# Patient Record
Sex: FEMALE | Race: WHITE | HISPANIC OR LATINO
[De-identification: names, ages, dates, MRNs, and addresses within clinical notes are randomized per-mention and may not be internally consistent; named-entity substitution may affect disease eponyms.]

---

## 2017-03-04 ENCOUNTER — APPOINTMENT (OUTPATIENT)
Dept: MAMMOGRAPHY | Facility: IMAGING CENTER | Age: 72
End: 2017-03-04

## 2017-03-04 ENCOUNTER — OUTPATIENT (OUTPATIENT)
Dept: OUTPATIENT SERVICES | Facility: HOSPITAL | Age: 72
LOS: 1 days | End: 2017-03-04
Payer: COMMERCIAL

## 2017-03-04 DIAGNOSIS — Z00.8 ENCOUNTER FOR OTHER GENERAL EXAMINATION: ICD-10-CM

## 2017-03-04 PROCEDURE — 77063 BREAST TOMOSYNTHESIS BI: CPT

## 2017-03-04 PROCEDURE — 77067 SCR MAMMO BI INCL CAD: CPT

## 2018-03-15 ENCOUNTER — OUTPATIENT (OUTPATIENT)
Dept: OUTPATIENT SERVICES | Facility: HOSPITAL | Age: 73
LOS: 1 days | End: 2018-03-15

## 2018-03-15 ENCOUNTER — APPOINTMENT (OUTPATIENT)
Dept: OBGYN | Facility: HOSPITAL | Age: 73
End: 2018-03-15
Payer: MEDICARE

## 2018-03-15 ENCOUNTER — RESULT REVIEW (OUTPATIENT)
Age: 73
End: 2018-03-15

## 2018-03-15 ENCOUNTER — LABORATORY RESULT (OUTPATIENT)
Age: 73
End: 2018-03-15

## 2018-03-15 VITALS
HEIGHT: 63 IN | BODY MASS INDEX: 27.64 KG/M2 | WEIGHT: 156 LBS | DIASTOLIC BLOOD PRESSURE: 82 MMHG | SYSTOLIC BLOOD PRESSURE: 152 MMHG

## 2018-03-15 DIAGNOSIS — N95.2 POSTMENOPAUSAL ATROPHIC VAGINITIS: ICD-10-CM

## 2018-03-15 DIAGNOSIS — Z00.00 ENCOUNTER FOR GENERAL ADULT MEDICAL EXAMINATION W/OUT ABNORMAL FINDINGS: ICD-10-CM

## 2018-03-15 DIAGNOSIS — Z87.19 PERSONAL HISTORY OF OTHER DISEASES OF THE DIGESTIVE SYSTEM: ICD-10-CM

## 2018-03-15 DIAGNOSIS — M85.80 OTHER SPECIFIED DISORDERS OF BONE DENSITY AND STRUCTURE, UNSPECIFIED SITE: ICD-10-CM

## 2018-03-15 PROCEDURE — 99213 OFFICE O/P EST LOW 20 MIN: CPT | Mod: GE

## 2018-03-16 DIAGNOSIS — Z00.00 ENCOUNTER FOR GENERAL ADULT MEDICAL EXAMINATION WITHOUT ABNORMAL FINDINGS: ICD-10-CM

## 2018-03-16 DIAGNOSIS — M85.80 OTHER SPECIFIED DISORDERS OF BONE DENSITY AND STRUCTURE, UNSPECIFIED SITE: ICD-10-CM

## 2018-03-16 DIAGNOSIS — N95.2 POSTMENOPAUSAL ATROPHIC VAGINITIS: ICD-10-CM

## 2018-03-16 LAB
C TRACH RRNA SPEC QL NAA+PROBE: SIGNIFICANT CHANGE UP
HPV HIGH+LOW RISK DNA PNL CVX: SIGNIFICANT CHANGE UP
N GONORRHOEA RRNA SPEC QL NAA+PROBE: SIGNIFICANT CHANGE UP
SPECIMEN SOURCE: SIGNIFICANT CHANGE UP

## 2018-03-19 LAB — CYTOLOGY SPEC DOC CYTO: SIGNIFICANT CHANGE UP

## 2018-04-21 ENCOUNTER — EMERGENCY (EMERGENCY)
Facility: HOSPITAL | Age: 73
LOS: 1 days | Discharge: HOME | End: 2018-04-21
Attending: EMERGENCY MEDICINE | Admitting: EMERGENCY MEDICINE
Payer: COMMERCIAL

## 2018-04-21 VITALS
HEART RATE: 86 BPM | RESPIRATION RATE: 16 BRPM | SYSTOLIC BLOOD PRESSURE: 155 MMHG | OXYGEN SATURATION: 100 % | TEMPERATURE: 98 F | DIASTOLIC BLOOD PRESSURE: 87 MMHG

## 2018-04-21 LAB
APPEARANCE UR: CLEAR — SIGNIFICANT CHANGE UP
BILIRUB UR-MCNC: NEGATIVE — SIGNIFICANT CHANGE UP
BLOOD UR QL VISUAL: NEGATIVE — SIGNIFICANT CHANGE UP
COLOR SPEC: YELLOW — SIGNIFICANT CHANGE UP
GLUCOSE UR-MCNC: NEGATIVE — SIGNIFICANT CHANGE UP
KETONES UR-MCNC: NEGATIVE — SIGNIFICANT CHANGE UP
LEUKOCYTE ESTERASE UR-ACNC: HIGH
MUCOUS THREADS # UR AUTO: SIGNIFICANT CHANGE UP
NITRITE UR-MCNC: NEGATIVE — SIGNIFICANT CHANGE UP
PH UR: 7 — SIGNIFICANT CHANGE UP (ref 4.6–8)
PROT UR-MCNC: NEGATIVE MG/DL — SIGNIFICANT CHANGE UP
RBC CASTS # UR COMP ASSIST: HIGH (ref 0–?)
SP GR SPEC: 1.02 — SIGNIFICANT CHANGE UP (ref 1–1.04)
SQUAMOUS # UR AUTO: SIGNIFICANT CHANGE UP
UROBILINOGEN FLD QL: 1 MG/DL — SIGNIFICANT CHANGE UP
WBC UR QL: SIGNIFICANT CHANGE UP (ref 0–?)

## 2018-04-21 PROCEDURE — 72131 CT LUMBAR SPINE W/O DYE: CPT | Mod: 26

## 2018-04-21 PROCEDURE — 99284 EMERGENCY DEPT VISIT MOD MDM: CPT

## 2018-04-21 RX ORDER — ACETAMINOPHEN 500 MG
650 TABLET ORAL ONCE
Qty: 0 | Refills: 0 | Status: COMPLETED | OUTPATIENT
Start: 2018-04-21 | End: 2018-04-21

## 2018-04-21 RX ADMIN — Medication 650 MILLIGRAM(S): at 20:01
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
## 2018-04-22 VITALS
SYSTOLIC BLOOD PRESSURE: 122 MMHG | TEMPERATURE: 99 F | HEART RATE: 76 BPM | RESPIRATION RATE: 17 BRPM | DIASTOLIC BLOOD PRESSURE: 74 MMHG | OXYGEN SATURATION: 99 %

## 2018-04-22 LAB
ALBUMIN SERPL ELPH-MCNC: 3.8 G/DL — SIGNIFICANT CHANGE UP (ref 3.3–5)
ALP SERPL-CCNC: 70 U/L — SIGNIFICANT CHANGE UP (ref 40–120)
ALT FLD-CCNC: 16 U/L — SIGNIFICANT CHANGE UP (ref 4–33)
AST SERPL-CCNC: 18 U/L — SIGNIFICANT CHANGE UP (ref 4–32)
BILIRUB SERPL-MCNC: 1.2 MG/DL — SIGNIFICANT CHANGE UP (ref 0.2–1.2)
BUN SERPL-MCNC: 12 MG/DL — SIGNIFICANT CHANGE UP (ref 7–23)
CALCIUM SERPL-MCNC: 9.2 MG/DL — SIGNIFICANT CHANGE UP (ref 8.4–10.5)
CHLORIDE SERPL-SCNC: 103 MMOL/L — SIGNIFICANT CHANGE UP (ref 98–107)
CO2 SERPL-SCNC: 27 MMOL/L — SIGNIFICANT CHANGE UP (ref 22–31)
CREAT SERPL-MCNC: 0.7 MG/DL — SIGNIFICANT CHANGE UP (ref 0.5–1.3)
GLUCOSE SERPL-MCNC: 108 MG/DL — HIGH (ref 70–99)
HCT VFR BLD CALC: 37 % — SIGNIFICANT CHANGE UP (ref 34.5–45)
HGB BLD-MCNC: 12.2 G/DL — SIGNIFICANT CHANGE UP (ref 11.5–15.5)
MCHC RBC-ENTMCNC: 28.5 PG — SIGNIFICANT CHANGE UP (ref 27–34)
MCHC RBC-ENTMCNC: 33 % — SIGNIFICANT CHANGE UP (ref 32–36)
MCV RBC AUTO: 86.4 FL — SIGNIFICANT CHANGE UP (ref 80–100)
NRBC # FLD: 0 — SIGNIFICANT CHANGE UP
PLATELET # BLD AUTO: 143 K/UL — LOW (ref 150–400)
PMV BLD: 11.8 FL — SIGNIFICANT CHANGE UP (ref 7–13)
POTASSIUM SERPL-MCNC: 3.8 MMOL/L — SIGNIFICANT CHANGE UP (ref 3.5–5.3)
POTASSIUM SERPL-SCNC: 3.8 MMOL/L — SIGNIFICANT CHANGE UP (ref 3.5–5.3)
PROT SERPL-MCNC: 6.5 G/DL — SIGNIFICANT CHANGE UP (ref 6–8.3)
RBC # BLD: 4.28 M/UL — SIGNIFICANT CHANGE UP (ref 3.8–5.2)
RBC # FLD: 13.2 % — SIGNIFICANT CHANGE UP (ref 10.3–14.5)
SODIUM SERPL-SCNC: 140 MMOL/L — SIGNIFICANT CHANGE UP (ref 135–145)
WBC # BLD: 6.48 K/UL — SIGNIFICANT CHANGE UP (ref 3.8–10.5)
WBC # FLD AUTO: 6.48 K/UL — SIGNIFICANT CHANGE UP (ref 3.8–10.5)

## 2018-04-22 PROCEDURE — 74177 CT ABD & PELVIS W/CONTRAST: CPT | Mod: 26

## 2018-04-22 RX ORDER — OXYCODONE HYDROCHLORIDE 5 MG/1
1 TABLET ORAL
Qty: 8 | Refills: 0 | OUTPATIENT
Start: 2018-04-22

## 2018-04-22 RX ORDER — IBUPROFEN 200 MG
1 TABLET ORAL
Qty: 30 | Refills: 0 | OUTPATIENT
Start: 2018-04-22

## 2018-04-22 NOTE — ED PROVIDER NOTE - CHPI ED SYMPTOMS NEG
no loss of consciousness/no head trauma, no light headedness, no nausea, no vomiting, no fever, no chills, no CP, no SOB
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
## 2018-04-22 NOTE — ED ADULT NURSE REASSESSMENT NOTE - NS ED NURSE REASSESS COMMENT FT1
No acute distress at present. Respirations are even and unlabored on room air. Pt. is discharged. Discharge teaching done by MD Lay. IV discontinued, cath intact. Pt. is awaiting pickup from family at present.
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
## 2018-04-22 NOTE — ED PROVIDER NOTE - ABDOMINAL EXAM
mild suprapubic TTP, soft, nondistended, no rebound or guarding./tender... soft/nontender.../nondistended
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
## 2018-04-22 NOTE — ED ADULT NURSE REASSESSMENT NOTE - NS ED NURSE REASSESS COMMENT FT1
Pt. received from intake. Report received from AL[facility] Lloyd. Pt. received with 20 gauge IV in left ac. No acute distress at present. Respirations are even and unlabored on room air. Will continue to monitor.

## 2018-04-22 NOTE — ED ADULT NURSE NOTE - ED STAT RN HANDOFF DETAILS
Rpt to [facility] ED 18a, awaiting CT abd/pelvis, pt aaox3, ambulatory w/o asst, slowly, appearing in no distress, VSS.

## 2018-05-25 ENCOUNTER — APPOINTMENT (OUTPATIENT)
Dept: ORTHOPEDIC SURGERY | Facility: CLINIC | Age: 73
End: 2018-05-25
Payer: COMMERCIAL

## 2018-05-25 VITALS — WEIGHT: 159 LBS | HEIGHT: 63 IN | BODY MASS INDEX: 28.17 KG/M2

## 2018-05-25 DIAGNOSIS — M43.16 SPONDYLOLISTHESIS, LUMBAR REGION: ICD-10-CM

## 2018-05-25 DIAGNOSIS — S32.009A UNSPECIFIED FRACTURE OF UNSPECIFIED LUMBAR VERTEBRA, INITIAL ENCOUNTER FOR CLOSED FRACTURE: ICD-10-CM

## 2018-05-25 PROCEDURE — 72100 X-RAY EXAM L-S SPINE 2/3 VWS: CPT

## 2018-05-25 PROCEDURE — 99203 OFFICE O/P NEW LOW 30 MIN: CPT

## 2018-07-30 PROBLEM — N95.2 POSTMENOPAUSAL ATROPHIC VAGINITIS: Status: ACTIVE | Noted: 2018-03-15

## 2018-08-28 ENCOUNTER — APPOINTMENT (OUTPATIENT)
Dept: MAMMOGRAPHY | Facility: IMAGING CENTER | Age: 73
End: 2018-08-28
Payer: MEDICARE

## 2018-08-28 ENCOUNTER — OUTPATIENT (OUTPATIENT)
Dept: OUTPATIENT SERVICES | Facility: HOSPITAL | Age: 73
LOS: 1 days | End: 2018-08-28
Payer: COMMERCIAL

## 2018-08-28 DIAGNOSIS — Z00.8 ENCOUNTER FOR OTHER GENERAL EXAMINATION: ICD-10-CM

## 2018-08-28 PROCEDURE — 77067 SCR MAMMO BI INCL CAD: CPT

## 2018-08-28 PROCEDURE — 77067 SCR MAMMO BI INCL CAD: CPT | Mod: 26

## 2018-08-28 PROCEDURE — 77063 BREAST TOMOSYNTHESIS BI: CPT

## 2018-08-28 PROCEDURE — 77063 BREAST TOMOSYNTHESIS BI: CPT | Mod: 26

## 2019-10-28 ENCOUNTER — OUTPATIENT (OUTPATIENT)
Dept: OUTPATIENT SERVICES | Facility: HOSPITAL | Age: 74
LOS: 1 days | End: 2019-10-28
Payer: COMMERCIAL

## 2019-10-28 ENCOUNTER — APPOINTMENT (OUTPATIENT)
Dept: MAMMOGRAPHY | Facility: IMAGING CENTER | Age: 74
End: 2019-10-28
Payer: MEDICARE

## 2019-10-28 DIAGNOSIS — Z00.00 ENCOUNTER FOR GENERAL ADULT MEDICAL EXAMINATION WITHOUT ABNORMAL FINDINGS: ICD-10-CM

## 2019-10-28 PROCEDURE — 77063 BREAST TOMOSYNTHESIS BI: CPT | Mod: 26

## 2019-10-28 PROCEDURE — 77067 SCR MAMMO BI INCL CAD: CPT

## 2019-10-28 PROCEDURE — 77063 BREAST TOMOSYNTHESIS BI: CPT

## 2019-10-28 PROCEDURE — 77067 SCR MAMMO BI INCL CAD: CPT | Mod: 26

## 2020-09-01 ENCOUNTER — OUTPATIENT (OUTPATIENT)
Dept: OUTPATIENT SERVICES | Facility: HOSPITAL | Age: 75
LOS: 1 days | End: 2020-09-01
Payer: MEDICARE

## 2020-09-16 DIAGNOSIS — Z71.89 OTHER SPECIFIED COUNSELING: ICD-10-CM

## 2020-09-18 ENCOUNTER — APPOINTMENT (OUTPATIENT)
Dept: VASCULAR SURGERY | Facility: CLINIC | Age: 75
End: 2020-09-18
Payer: MEDICARE

## 2020-09-18 VITALS
HEART RATE: 71 BPM | WEIGHT: 150 LBS | SYSTOLIC BLOOD PRESSURE: 135 MMHG | HEIGHT: 63 IN | BODY MASS INDEX: 26.58 KG/M2 | DIASTOLIC BLOOD PRESSURE: 81 MMHG | TEMPERATURE: 98 F

## 2020-09-18 PROCEDURE — 93970 EXTREMITY STUDY: CPT

## 2020-09-18 PROCEDURE — 99203 OFFICE O/P NEW LOW 30 MIN: CPT
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
## 2020-09-18 NOTE — ASSESSMENT
[FreeTextEntry1] : symptomatic reflux\par rx for compression given\par given sx severity, impact on adl, rec venaseal ablation.  I did explain to pt that this would help the calf/ankle swelling and discomfort, but the thigh may not improve \par \par Pt also thinking about sclero for reticular veins.

## 2020-09-18 NOTE — PHYSICAL EXAM
[Normal Breath Sounds] : Normal breath sounds [Normal Heart Sounds] : normal heart sounds [2+] : left 2+ [JVD] : no jugular venous distention  [de-identified] : awake, alert [FreeTextEntry1] : feet warm well perfused.  \par numerous reticular veins bilat\par mild ankle edema, L>R\par no wounds

## 2020-10-01 PROCEDURE — G9005: CPT

## 2020-10-26 ENCOUNTER — NON-APPOINTMENT (OUTPATIENT)
Age: 75
End: 2020-10-26

## 2020-12-09 ENCOUNTER — APPOINTMENT (OUTPATIENT)
Dept: VASCULAR SURGERY | Facility: CLINIC | Age: 75
End: 2020-12-09
Payer: MEDICARE

## 2020-12-09 PROCEDURE — 36482Z ENDOVEN THER CHEM ADHES 1ST: CUSTOM | Mod: LT

## 2020-12-09 PROCEDURE — 99072 ADDL SUPL MATRL&STAF TM PHE: CPT

## 2020-12-09 NOTE — PROCEDURE
[FreeTextEntry1] : left GSV Venaseal [FreeTextEntry3] : Procedural safety checklist and time out completed:\par Confirmed patient identification (Patient Name, , and/or medical record number including when possible affirmation by patient or parent/family/other).\par Confirmed procedure with the patient. Consent present, accurate and signed. \par Confirmed special equipment and supplies are present.\par Sterility confirmed. Position verified. \par Site/ side is marked and visible and confirmed. \par Procedure confirmed by consent. Accurate consent including side and site.\par Review of medical records, including venous ultrasound, noting correct procedure including site and side.\par MD/PA verifies presence and review of imaging studies and or written report of imaging studies.\par Agreement on the procedure to be performed\par Time out completed.\par All of the above has been confirmed by the team.\par All patient-specific concerns have been addressed. \par \par Indication: Left lower extremity varicose veins with inflammation, leg pain, leg swelling, and leg cramping.  Venous insufficiency/ reflux.\par \par Procedure:  Endovenous ablation of the left great saphenous vein with VenaSeal™ Closure System\par 	\par [Ms. DEISY DOHERTY is a 60 year old F with a history of left lower extremity varicose veins and venous insufficiency previously seen in the office.  Ultrasound examination demonstrated venous insufficiency. A trial of compression stockings, exercise, elevation, and pain medication was attempted without relief and definitive treatment with radiofrequency ablation was offered. \par \par I have discussed the risks of the procedure at length with the patient. The risks discussed were inclusive of but not limited to infection, irritation at the site of infiltration of local anesthesia, and also rare risk of deep venous thrombosis and pulmonary emboli. The patient agrees to proceed with the procedure. Pre-procedure COVID PCR was negative. \par \par The patient was escorted into the procedure room and a time out called.\par \par The entire limb was prepped and draped in sterile fashion. Ultrasound guidance was used to localize the access site. 1% lidocaine was injected as a local anesthetic in the subcutaneous tissues at the target location in the leg. Using ultrasound guidance, access was gained at this location with the 19 gauge thin walled access needle and followed by introduction of a short guidewire, location confirmed with ultrasound. A small, 3 mm incision was made at the access site to allow for introduction and placement of the 7 Fr x7cm introducer/dilator. The dilator and guidewire were removed. The 0.035 guidewire from the Venaseal kit was then introduced and positioned at the left saphenofemoral junction using ultrasound guidance. The 80 cm 7 Fr introducer sheath/dilator was positioned 5cm from the saphenofemoral junction. The guidewire and dilator were removed, and the remaining sheath was flushed with sterile saline, with the syringe remaining in place prior to the next steps. \par \par The cyanoacrylate adhesive was precisely primed into the 5 F delivery catheter and this catheter/syringe combination was attached within the dispenser gun. This “assembly” was introduced through the 7 F sheath and positioned 5 cm caudal of the saphenofemoral junction under ultrasound guidance. The steps from the IFU were followed for dispensing amounts, locations and compression times, e.g 2 aliquots proximally with 3 minutes of compression, and 1 aliquot every 3cm distally with 30 sec of compression along the course of the vessel Following the last injection and compression sequence, the catheter and introducer sheath were pulled out from the access site. Hemostasis was achieved with manual compression and an adhesive bandage was applied to the incision. Ultrasound confirmed complete coaptation and closure of the treated segments of the GSV, and the absence of any DVT at the saphenofemoral junction. \par \par Treatment length of the vein 48 cm.\par \par The drapes were removed and the patient cleaned and prepared for discharge. Patient tolerated procedure well. Patient was given post-procedure instructions and follow up appointment was scheduled.  Post op ultrasound check is scheduled for 48- 72 hours \par \par \par

## 2020-12-14 ENCOUNTER — APPOINTMENT (OUTPATIENT)
Dept: VASCULAR SURGERY | Facility: CLINIC | Age: 75
End: 2020-12-14
Payer: MEDICARE

## 2020-12-14 PROCEDURE — 93971 EXTREMITY STUDY: CPT

## 2020-12-14 PROCEDURE — 99072 ADDL SUPL MATRL&STAF TM PHE: CPT

## 2020-12-18 ENCOUNTER — APPOINTMENT (OUTPATIENT)
Dept: VASCULAR SURGERY | Facility: CLINIC | Age: 75
End: 2020-12-18
Payer: MEDICARE

## 2020-12-18 VITALS
WEIGHT: 145 LBS | SYSTOLIC BLOOD PRESSURE: 126 MMHG | HEART RATE: 92 BPM | TEMPERATURE: 98.1 F | BODY MASS INDEX: 25.69 KG/M2 | DIASTOLIC BLOOD PRESSURE: 84 MMHG | HEIGHT: 63 IN

## 2020-12-18 PROCEDURE — 99212 OFFICE O/P EST SF 10 MIN: CPT

## 2020-12-18 PROCEDURE — 99072 ADDL SUPL MATRL&STAF TM PHE: CPT

## 2020-12-18 RX ORDER — DICLOFENAC SODIUM 1% 10 MG/G
1 GEL TOPICAL DAILY
Qty: 1 | Refills: 0 | Status: ACTIVE | COMMUNITY
Start: 2020-12-18 | End: 1900-01-01
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
## 2020-12-18 NOTE — ASSESSMENT
[FreeTextEntry1] : poss hypersensitivity  to venaseal cag\par rec warm compress, rx for diclofenac gel given\par self limited course

## 2020-12-18 NOTE — PHYSICAL EXAM
[JVD] : no jugular venous distention  [Normal Breath Sounds] : Normal breath sounds [Normal Heart Sounds] : normal heart sounds [2+] : left 2+ [de-identified] : awake, alert [FreeTextEntry1] : feet warm well perfused.  \par There is mild erythema along the course of the GSV, no warmth, no flucutance, no other signs of infection

## 2021-01-06 ENCOUNTER — APPOINTMENT (OUTPATIENT)
Dept: VASCULAR SURGERY | Facility: CLINIC | Age: 76
End: 2021-01-06

## 2021-01-20 ENCOUNTER — APPOINTMENT (OUTPATIENT)
Dept: VASCULAR SURGERY | Facility: CLINIC | Age: 76
End: 2021-01-20
Payer: MEDICARE

## 2021-01-20 PROCEDURE — 99442: CPT

## 2021-01-20 NOTE — ASSESSMENT
[FreeTextEntry1] : likely reaction to the cyanoacrylate, resolved\par \par pt considering sclero, will call for fu
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
## 2021-01-20 NOTE — PHYSICAL EXAM
[JVD] : no jugular venous distention  [Normal Breath Sounds] : Normal breath sounds [Normal Heart Sounds] : normal heart sounds [2+] : left 2+ [de-identified] : awake, alert [FreeTextEntry1] : feet warm well perfused.  \par There is mild erythema along the course of the GSV, no warmth, no flucutance, no other signs of infection

## 2021-07-27 ENCOUNTER — EMERGENCY (EMERGENCY)
Facility: HOSPITAL | Age: 76
LOS: 1 days | Discharge: HOME | End: 2021-07-27
Attending: CLINIC/CENTER | Admitting: CLINIC/CENTER
Payer: MEDICARE

## 2021-07-27 VITALS
RESPIRATION RATE: 18 BRPM | HEART RATE: 89 BPM | OXYGEN SATURATION: 100 % | DIASTOLIC BLOOD PRESSURE: 87 MMHG | SYSTOLIC BLOOD PRESSURE: 138 MMHG | TEMPERATURE: 99 F

## 2021-07-27 VITALS
RESPIRATION RATE: 18 BRPM | OXYGEN SATURATION: 99 % | HEART RATE: 85 BPM | SYSTOLIC BLOOD PRESSURE: 135 MMHG | DIASTOLIC BLOOD PRESSURE: 72 MMHG

## 2021-07-27 LAB
ALBUMIN SERPL ELPH-MCNC: 4 G/DL — SIGNIFICANT CHANGE UP (ref 3.3–5)
ALP SERPL-CCNC: 63 U/L — SIGNIFICANT CHANGE UP (ref 40–120)
ALT FLD-CCNC: 11 U/L — SIGNIFICANT CHANGE UP (ref 4–33)
ANION GAP SERPL CALC-SCNC: 12 MMOL/L — SIGNIFICANT CHANGE UP (ref 7–14)
AST SERPL-CCNC: 14 U/L — SIGNIFICANT CHANGE UP (ref 4–32)
BASOPHILS # BLD AUTO: 0.03 K/UL — SIGNIFICANT CHANGE UP (ref 0–0.2)
BASOPHILS NFR BLD AUTO: 0.5 % — SIGNIFICANT CHANGE UP (ref 0–2)
BILIRUB SERPL-MCNC: 1.4 MG/DL — HIGH (ref 0.2–1.2)
BUN SERPL-MCNC: 8 MG/DL — SIGNIFICANT CHANGE UP (ref 7–23)
CALCIUM SERPL-MCNC: 9.5 MG/DL — SIGNIFICANT CHANGE UP (ref 8.4–10.5)
CHLORIDE SERPL-SCNC: 105 MMOL/L — SIGNIFICANT CHANGE UP (ref 98–107)
CO2 SERPL-SCNC: 23 MMOL/L — SIGNIFICANT CHANGE UP (ref 22–31)
CREAT SERPL-MCNC: 0.78 MG/DL — SIGNIFICANT CHANGE UP (ref 0.5–1.3)
D DIMER BLD IA.RAPID-MCNC: 209 NG/ML DDU — SIGNIFICANT CHANGE UP
EOSINOPHIL # BLD AUTO: 0.03 K/UL — SIGNIFICANT CHANGE UP (ref 0–0.5)
EOSINOPHIL NFR BLD AUTO: 0.5 % — SIGNIFICANT CHANGE UP (ref 0–6)
GLUCOSE SERPL-MCNC: 101 MG/DL — HIGH (ref 70–99)
HCT VFR BLD CALC: 38.5 % — SIGNIFICANT CHANGE UP (ref 34.5–45)
HGB BLD-MCNC: 12.6 G/DL — SIGNIFICANT CHANGE UP (ref 11.5–15.5)
IANC: 4.28 K/UL — SIGNIFICANT CHANGE UP (ref 1.5–8.5)
IMM GRANULOCYTES NFR BLD AUTO: 0.3 % — SIGNIFICANT CHANGE UP (ref 0–1.5)
LYMPHOCYTES # BLD AUTO: 0.97 K/UL — LOW (ref 1–3.3)
LYMPHOCYTES # BLD AUTO: 16.2 % — SIGNIFICANT CHANGE UP (ref 13–44)
MCHC RBC-ENTMCNC: 28 PG — SIGNIFICANT CHANGE UP (ref 27–34)
MCHC RBC-ENTMCNC: 32.7 GM/DL — SIGNIFICANT CHANGE UP (ref 32–36)
MCV RBC AUTO: 85.6 FL — SIGNIFICANT CHANGE UP (ref 80–100)
MONOCYTES # BLD AUTO: 0.66 K/UL — SIGNIFICANT CHANGE UP (ref 0–0.9)
MONOCYTES NFR BLD AUTO: 11 % — SIGNIFICANT CHANGE UP (ref 2–14)
NEUTROPHILS # BLD AUTO: 4.28 K/UL — SIGNIFICANT CHANGE UP (ref 1.8–7.4)
NEUTROPHILS NFR BLD AUTO: 71.5 % — SIGNIFICANT CHANGE UP (ref 43–77)
NRBC # BLD: 0 /100 WBCS — SIGNIFICANT CHANGE UP
NRBC # FLD: 0 K/UL — SIGNIFICANT CHANGE UP
PLATELET # BLD AUTO: 171 K/UL — SIGNIFICANT CHANGE UP (ref 150–400)
POTASSIUM SERPL-MCNC: 4.2 MMOL/L — SIGNIFICANT CHANGE UP (ref 3.5–5.3)
POTASSIUM SERPL-SCNC: 4.2 MMOL/L — SIGNIFICANT CHANGE UP (ref 3.5–5.3)
PROT SERPL-MCNC: 6.5 G/DL — SIGNIFICANT CHANGE UP (ref 6–8.3)
RBC # BLD: 4.5 M/UL — SIGNIFICANT CHANGE UP (ref 3.8–5.2)
RBC # FLD: 13.5 % — SIGNIFICANT CHANGE UP (ref 10.3–14.5)
SODIUM SERPL-SCNC: 140 MMOL/L — SIGNIFICANT CHANGE UP (ref 135–145)
TROPONIN T, HIGH SENSITIVITY RESULT: <6 NG/L — SIGNIFICANT CHANGE UP
TROPONIN T, HIGH SENSITIVITY RESULT: <6 NG/L — SIGNIFICANT CHANGE UP
WBC # BLD: 5.99 K/UL — SIGNIFICANT CHANGE UP (ref 3.8–10.5)
WBC # FLD AUTO: 5.99 K/UL — SIGNIFICANT CHANGE UP (ref 3.8–10.5)

## 2021-07-27 PROCEDURE — 93010 ELECTROCARDIOGRAM REPORT: CPT

## 2021-07-27 PROCEDURE — 99284 EMERGENCY DEPT VISIT MOD MDM: CPT | Mod: 24

## 2021-07-27 PROCEDURE — 99285 EMERGENCY DEPT VISIT HI MDM: CPT | Mod: 25

## 2021-07-27 PROCEDURE — 71045 X-RAY EXAM CHEST 1 VIEW: CPT | Mod: 26

## 2021-07-27 RX ORDER — LIDOCAINE 4 G/100G
1 CREAM TOPICAL ONCE
Refills: 0 | Status: DISCONTINUED | OUTPATIENT
Start: 2021-07-27 | End: 2021-07-31

## 2021-07-27 RX ORDER — LIDOCAINE 4 G/100G
1 CREAM TOPICAL ONCE
Refills: 0 | Status: DISCONTINUED | OUTPATIENT
Start: 2021-07-27 | End: 2021-07-27

## 2021-07-27 NOTE — ED PROVIDER NOTE - OBJECTIVE STATEMENT
75yo F h.o GERD presents to ED for 2 days of R sided chest and back pain, constant, worse with deep breaths and movement of R shoulder. Pain is described as soreness, no alleviating factors, has not tried at home analgesics. Pt also reports mild headache. Triage note mentions subjective fever however pt denies. 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 Has never had similar pain. Denies h.o COVID, received full vaccinations over 2 months ago.

## 2021-07-27 NOTE — ED PROVIDER NOTE - CPE EDP PSYCH NORM
normal... Apixaban/Eliquis - Compliance/Apixaban/Eliquis - Follow up monitoring/Apixaban/Eliquis - Potential for adverse drug reactions and interactions/Apixaban/Eliquis - Dietary Advice

## 2021-07-27 NOTE — ED PROVIDER NOTE - CLINICAL SUMMARY MEDICAL DECISION MAKING FREE TEXT BOX
75yo F with no cardiac risk factors aside from age presenting w R sided chest and back pain after over exertion. Wells score 0. Heart score 2 before trop 75yo F with no cardiac risk factors aside from age presenting w R sided chest and back pain after over exertion. Wells score 0.   see attending attestation

## 2021-07-27 NOTE — ED PROVIDER NOTE - NSFOLLOWUPINSTRUCTIONS_ED_ALL_ED_FT
You were seen and evaluated today for chest and back pain likely caused by caused by muscle strain. Our work-up today included blood work, ekg and chest x-ray, the results of which have been explained to you and provided to you separately. Please keep a copy of these results to present to doctor in future visits.     - Follow up with your primary care physician within 7-10 days to discuss recent symptoms and elevated bilirubin  Take 500-1000mg acetaminophen (tylenol) every 6-8 hours as needed  Take 400-600mg ibuprofen (advil or motrin) every 6-8 hours as needed, take with food  - Please refrain from heavy lifting or strenuous activity for the next 1-2 weeks.     Please return to the Emergency Department should you experience any of the following:  - New or worsening chest painpain  - Numbness or weakness of your legs or feet  - Fever, unexplained weight loss, night sweats  - Blood in stool or in urine  - New weakness, fatigue, or fainting  - Any new concerning symptoms

## 2021-07-27 NOTE — ED ADULT NURSE NOTE - OBJECTIVE STATEMENT
Pt received to room 16 presents with CP that began last night, worse upon exertion. Pt reports pain radiates to left arm. pt a&ox3, ambulatory at baseline , skin intact, respirations even and unlabored, denies chills, nausea, or any other symptoms. Pt NSR on CM, will continue to monitor.

## 2021-07-27 NOTE — ED PROVIDER NOTE - PROGRESS NOTE DETAILS
Celestine Anthony, PGY-1: Pt reexamined at bedside, resting comfortably, vitals stable. Trop negative, ddimer negative, cxr wnl. Discussed red flag symptoms with patient for ACS. Pt will f/u with PCP this week to discuss elevated bili and recent symptoms. Advised to drink fluids and take tylenol/advil as needed for soreness. return precautions given.

## 2021-07-27 NOTE — ED PROVIDER NOTE - PATIENT PORTAL LINK FT
You can access the FollowMyHealth Patient Portal offered by [facility] by registering at the following website: [email or web address]. By joining [employer]’s FollowMyHealth portal, you will also be able to view your health information using other applications (apps) compatible with our system.

## 2021-07-27 NOTE — ED PROVIDER NOTE - ATTENDING CONTRIBUTION TO CARE
Agree with above except noted:     h.o GERD, p.w Right sided chest pain w. movement and inspiration in the setting of recent increase physical exercise. no shortness of breath. pain reproducible on exam with right arm movement. non tachycardiac, nontoxic appearing, NAD, vital signs wnl, though recent air travel but no LE edema or pain, low suspicion for PE, will get d-dimer and due to age will get ekg, cxr and trop for low risk chest pain. history and physical non consistent with aortic dissection, esophageal perforation, or cardiac tamponade. will symptomatic/pain control at this time. will d.c with outpatient if neg workup in ER.

## 2023-01-20 ENCOUNTER — OUTPATIENT (OUTPATIENT)
Dept: OUTPATIENT SERVICES | Facility: HOSPITAL | Age: 78
LOS: 1 days | End: 2023-01-20
Payer: MEDICARE

## 2023-01-20 ENCOUNTER — APPOINTMENT (OUTPATIENT)
Dept: MAMMOGRAPHY | Facility: IMAGING CENTER | Age: 78
End: 2023-01-20

## 2023-01-20 DIAGNOSIS — Z00.8 ENCOUNTER FOR OTHER GENERAL EXAMINATION: ICD-10-CM

## 2023-01-20 PROCEDURE — 77063 BREAST TOMOSYNTHESIS BI: CPT

## 2023-01-20 PROCEDURE — 77067 SCR MAMMO BI INCL CAD: CPT

## 2023-01-20 PROCEDURE — 77067 SCR MAMMO BI INCL CAD: CPT | Mod: 26

## 2023-01-20 PROCEDURE — 77063 BREAST TOMOSYNTHESIS BI: CPT | Mod: 26

## 2023-09-07 NOTE — ED PROVIDER NOTE - NS_HEARTSCAGE_ED_A_ED
Render In Strict Bullet Format?: No Detail Level: Zone Plan: Patient to use 5- Fluorouracil 5%, Calcipotriene 0.005% Cream to affected areas on the nose twice daily x 4 days. patient will call us for prescription when she is ready to start the treatment Greater than or equal to 65

## 2023-09-11 ENCOUNTER — APPOINTMENT (OUTPATIENT)
Dept: OBGYN | Facility: HOSPITAL | Age: 78
End: 2023-09-11
Payer: MEDICARE

## 2023-09-11 ENCOUNTER — OUTPATIENT (OUTPATIENT)
Dept: OUTPATIENT SERVICES | Facility: HOSPITAL | Age: 78
LOS: 1 days | End: 2023-09-11

## 2023-09-11 VITALS
SYSTOLIC BLOOD PRESSURE: 148 MMHG | BODY MASS INDEX: 26.58 KG/M2 | HEIGHT: 63 IN | DIASTOLIC BLOOD PRESSURE: 88 MMHG | TEMPERATURE: 97.8 F | HEART RATE: 108 BPM | WEIGHT: 150 LBS

## 2023-09-11 DIAGNOSIS — Z01.419 ENCOUNTER FOR GYNECOLOGICAL EXAMINATION (GENERAL) (ROUTINE) W/OUT ABNORMAL FINDINGS: ICD-10-CM

## 2023-09-11 PROCEDURE — 99213 OFFICE O/P EST LOW 20 MIN: CPT | Mod: GC

## 2023-09-12 DIAGNOSIS — Z01.419 ENCOUNTER FOR GYNECOLOGICAL EXAMINATION (GENERAL) (ROUTINE) WITHOUT ABNORMAL FINDINGS: ICD-10-CM

## 2024-03-08 ENCOUNTER — APPOINTMENT (OUTPATIENT)
Dept: VASCULAR SURGERY | Facility: CLINIC | Age: 79
End: 2024-03-08
Payer: MEDICARE

## 2024-03-08 VITALS
HEART RATE: 77 BPM | DIASTOLIC BLOOD PRESSURE: 84 MMHG | SYSTOLIC BLOOD PRESSURE: 128 MMHG | TEMPERATURE: 97.7 F | HEIGHT: 63 IN

## 2024-03-08 PROCEDURE — 99214 OFFICE O/P EST MOD 30 MIN: CPT

## 2024-03-08 PROCEDURE — 93970 EXTREMITY STUDY: CPT

## 2024-03-11 NOTE — HISTORY OF PRESENT ILLNESS
[FreeTextEntry1] : 79F previous left GSV venaseal. Here with bilateral leg pain, Left worse than right. Mainly in the thigh and behind the knee.

## 2024-03-11 NOTE — END OF VISIT
[] : Resident [FreeTextEntry3] : non vascular etiology, rec workup for musculoskeletal v neurogenic  causes [Time Spent: ___ minutes] : I have spent [unfilled] minutes of time on the encounter.

## 2024-03-11 NOTE — PHYSICAL EXAM
[2+] : left 2+ [Ankle Swelling (On Exam)] : present [Ankle Swelling Bilaterally] : bilaterally  [Varicose Veins Of Lower Extremities] : bilaterally [Ankle Swelling On The Right] : mild [] : not present

## 2024-03-11 NOTE — ASSESSMENT
[FreeTextEntry1] : 79F with previous left GSV venaseal in 2021 presents with bilateral leg pain, left worse than right. Mild swelling. No reflux noted on US.  Pain likely MSK or neurogenic, also has known L1 compression fracture  Needs further workup to evaluate etiology of pain  F/up as needed

## 2024-03-22 ENCOUNTER — APPOINTMENT (OUTPATIENT)
Dept: VASCULAR SURGERY | Facility: CLINIC | Age: 79
End: 2024-03-22
Payer: MEDICARE

## 2024-03-22 VITALS
HEART RATE: 74 BPM | BODY MASS INDEX: 25.52 KG/M2 | SYSTOLIC BLOOD PRESSURE: 149 MMHG | WEIGHT: 144 LBS | HEIGHT: 63 IN | DIASTOLIC BLOOD PRESSURE: 89 MMHG | TEMPERATURE: 97.6 F

## 2024-03-22 DIAGNOSIS — I83.893 VARICOSE VEINS OF BILATERAL LOWER EXTREMITIES WITH OTHER COMPLICATIONS: ICD-10-CM

## 2024-03-22 PROCEDURE — 99214 OFFICE O/P EST MOD 30 MIN: CPT

## 2024-03-22 PROCEDURE — 93971 EXTREMITY STUDY: CPT | Mod: LT

## 2024-03-26 PROBLEM — I83.893 SYMPTOMATIC VARICOSE VEINS OF BOTH LOWER EXTREMITIES: Status: ACTIVE | Noted: 2020-09-18

## 2024-03-26 NOTE — ASSESSMENT
[FreeTextEntry1] : 79F with previous left GSV venaseal in 2021 presents with bilateral leg pain, left worse than right. Mild swelling. No reflux noted on US.  Pain likely MSK or neurogenic, also has known L1 compression fracture  Needs further workup to evaluate etiology of pain Referral number given for ortho/spine  F/up as needed.

## 2024-03-26 NOTE — PHYSICAL EXAM
[2+] : left 2+ [Ankle Swelling Bilaterally] : bilaterally  [Ankle Swelling (On Exam)] : not present [FreeTextEntry1] : spider veins present bilaterally

## 2024-03-26 NOTE — END OF VISIT
[] : Resident [FreeTextEntry3] : I do not think pain is of vascular etiology.  duplex confirms ablated gsv, no dvt, no residual reflux.  need to ro neurogenic/msk causes [Time Spent: ___ minutes] : I have spent [unfilled] minutes of time on the encounter.

## 2024-12-10 ENCOUNTER — APPOINTMENT (OUTPATIENT)
Dept: ULTRASOUND IMAGING | Facility: IMAGING CENTER | Age: 79
End: 2024-12-10

## 2024-12-10 ENCOUNTER — APPOINTMENT (OUTPATIENT)
Dept: MAMMOGRAPHY | Facility: IMAGING CENTER | Age: 79
End: 2024-12-10

## 2024-12-10 ENCOUNTER — OUTPATIENT (OUTPATIENT)
Dept: OUTPATIENT SERVICES | Facility: HOSPITAL | Age: 79
LOS: 1 days | End: 2024-12-10
Payer: COMMERCIAL

## 2024-12-10 DIAGNOSIS — Z00.8 ENCOUNTER FOR OTHER GENERAL EXAMINATION: ICD-10-CM

## 2024-12-10 PROCEDURE — G0279: CPT

## 2024-12-10 PROCEDURE — 77066 DX MAMMO INCL CAD BI: CPT

## 2024-12-10 PROCEDURE — 76642 ULTRASOUND BREAST LIMITED: CPT

## 2025-04-28 ENCOUNTER — EMERGENCY (EMERGENCY)
Facility: HOSPITAL | Age: 80
LOS: 1 days | End: 2025-04-28
Attending: EMERGENCY MEDICINE
Payer: COMMERCIAL

## 2025-04-28 VITALS
WEIGHT: 139.99 LBS | OXYGEN SATURATION: 97 % | DIASTOLIC BLOOD PRESSURE: 91 MMHG | HEART RATE: 78 BPM | RESPIRATION RATE: 18 BRPM | SYSTOLIC BLOOD PRESSURE: 152 MMHG | TEMPERATURE: 98 F | HEIGHT: 64 IN

## 2025-04-28 VITALS
DIASTOLIC BLOOD PRESSURE: 85 MMHG | SYSTOLIC BLOOD PRESSURE: 134 MMHG | RESPIRATION RATE: 16 BRPM | OXYGEN SATURATION: 98 % | TEMPERATURE: 98 F | HEART RATE: 73 BPM

## 2025-04-28 PROCEDURE — 99284 EMERGENCY DEPT VISIT MOD MDM: CPT

## 2025-04-28 PROCEDURE — T1013: CPT

## 2025-04-28 RX ORDER — FLUORESCEIN SODIUM 0.6 MG
1 STRIP OPHTHALMIC (EYE) ONCE
Refills: 0 | Status: COMPLETED | OUTPATIENT
Start: 2025-04-28 | End: 2025-04-28

## 2025-04-28 RX ORDER — PREDNISONE 20 MG/1
50 TABLET ORAL ONCE
Refills: 0 | Status: DISCONTINUED | OUTPATIENT
Start: 2025-04-28 | End: 2025-04-28

## 2025-04-28 RX ORDER — PREDNISONE 20 MG/1
50 TABLET ORAL ONCE
Refills: 0 | Status: COMPLETED | OUTPATIENT
Start: 2025-04-28 | End: 2025-04-28

## 2025-04-28 RX ORDER — PREDNISONE 20 MG/1
1 TABLET ORAL
Qty: 4 | Refills: 0
Start: 2025-04-28 | End: 2025-05-01

## 2025-04-28 RX ADMIN — Medication 1000 MILLIGRAM(S): at 08:56

## 2025-04-28 RX ADMIN — PREDNISONE 50 MILLIGRAM(S): 20 TABLET ORAL at 09:20

## 2025-04-28 RX ADMIN — Medication 1 APPLICATION(S): at 08:50

## 2025-04-28 NOTE — ED PROVIDER NOTE - CLINICAL SUMMARY MEDICAL DECISION MAKING FREE TEXT BOX
81yo female no pmhx presenting to the ED with 3 days of pruritic rash on left side of scale and forehead. Endorses mild neuropathic pain but denies headache.  Denies history of similar rash.  Denies vision changes or eye pain.  Denies fever, chills, chest pain, shortness of breath.  Denies rash in any other locations.  Patient is well-appearing, vital stable, afebrile.  Resting in no acute distress.  On exam erythematous, pustular rash involving the V1 dermatome.  Mild blepharitis, no subconjunctival hemorrhage, purulent drainage.  Rash appears to be consistent with herpes zoster.  Woods lamp exam  Ear exam-   Will discharge with valacyclovir and ophthalmology consult.  Patient does not endorse severe pain, no need for gabapentin at this time. 79yo female no pmhx presenting to the ED with 3 days of pruritic rash on left side of scale and forehead. Endorses mild neuropathic pain but denies headache.  Denies history of similar rash.  Denies vision changes or eye pain.  Denies fever, chills, chest pain, shortness of breath.  Denies rash in any other locations.  Patient is well-appearing, vital stable, afebrile.  Resting in no acute distress.  On exam erythematous, pustular rash involving the V1 dermatome.  Mild blepharitis, no subconjunctival hemorrhage, purulent drainage.  Rash appears to be consistent with herpes zoster.  No ulcerations visualized on woods lamp examination with fluorscien. TM clear, no vesicular lesions in cancal.  Will discharge with valacyclovir and ophthalmology consult.  Patient does not endorse severe pain, no need for gabapentin at this time.

## 2025-04-28 NOTE — ED ADULT NURSE NOTE - OBJECTIVE STATEMENT
79 yo F presents to ED A+OX3 accompanied by grandson c/o rash. Patient is Slovenian speaking,  used ID # [id number], Ronald. Patient reports 3 days of a rash to her L side of her face. States the area is painful and she has discomfort. Small red/purple circular discolorations noted to L side of face and head with no active drainage noted. Patient denies visual changes. Denies fever, chills. Breathing is spontaneous and unlabored on room air. Skin warm dry and of color appropriate for ethnicity. Grandson at bedside.

## 2025-04-28 NOTE — ED PROVIDER NOTE - NSFOLLOWUPINSTRUCTIONS_ED_ALL_ED_FT
Prednisona 50mg wendy vez al día shona 4 días.  Valtrex 1000mg nilsa veces al día shona 7 días.  Seguimiento con oftalmología y salazar médico de cabecera/familiar.  Regrese a la kyle de emergencias si la erupción empeora, si tiene problemas de visión o cualquier otra preocupación. Es posible que sienta dolor incluso después de que la erupción desaparezca.    Culebrilla  Shingles  A rash with blisters on the skin.  La culebrilla, o herpes zóster, es wendy infección. Produce wendy erupción cutánea y ampollas. Estas zonas infectadas pueden doler mucho.    La culebrilla solo ocurre si:  Ha tenido varicela.  Le kerr aplicado wendy inyección llamada vacuna para protegerlo de la varicela. La culebrilla es poco frecuente en nicole allen.  ¿Cuáles son las causas?  La culebrilla es causada por un germen llamado virus de la varicela zóster. Nicole es el mismo germen que causa la varicela. Después de estar expuesto al germen, nicole permanece en el cuerpo, yanique está latente. [place] significa que no está activo.    La culebrilla se produce si el germen se vuelve activo nuevamente. [place] puede ocurrir años después de la primera exposición al germen.    ¿Qué incrementa el riesgo?  Es más probable que tenga culebrilla si:  Es mayor de 60 años.  Está bajo mucho estrés.  Tiene un sistema inmunitario débil. El sistema inmunitario es el sistema de defensa de salazar cuerpo. Puede estar débil si la persona:  Tiene el virus de inmunodeficiencia humana (VIH).  Tiene síndrome de inmunodeficiencia adquirida (sida).  Tiene cáncer.  Riddhi medicamentos que debilitan el sistema inmunitario. Estos incluyen los medicamentos para el trasplante de órganos.  ¿Cuáles son los signos o síntomas?  Los primeros síntomas de la culebrilla pueden ser picazón, hormigueo o dolor. Es posible que sienta que le arde la piel.    Unos días o semanas más tarde, tendrá wendy erupción cutánea. [place] es lo que puede esperar:  Es probable que la erupción aparezca en un solo lado del cuerpo.  La erupción puede tener forma de cinturón o cinta. Con el tiempo, se convertirá en ampollas llenas de líquido.  Las ampollas se romperán y se transformarán en costras.  Las costras se secarán en unas 2 o 3 semanas.  También puede tener:  Fiebre.  Escalofríos.  Dolor de ashish.  Náuseas.  ¿Cómo se diagnostica?  La culebrilla se diagnostica con un examen de la piel. Se puede cele wendy muestra llamada cultivo de wendy de las ampollas y enviarla a un laboratorio. [place] indicará si tiene culebrilla.    ¿Cómo se trata?  La erupción cutánea puede durar varias semanas. No hay daisha para la culebrilla, yanique el médico puede darle medicamentos. Estos medicamentos pueden hacer lo siguiente:  Ayudar a aliviar el dolor.  Ayudar a la picazón.  Ayudar con la irritación y la hinchazón.  Lograr wendy mejoría más pronto.  Prevenir problemas a elda plazo.  Si la erupción está en la sandra, es posible que deba consultar a un oculista o a un médico especialista en nariz, garganta y oído (otorrinolaringólogo).    Siga estas instrucciones en salazar casa:  Medicamentos    Use los medicamentos solamente erick se lo haya indicado el médico.  Use wendy crema para calmar la picazón o cremas anestésicas en la erupción cutánea o las ampollas según las indicaciones del médico.  Para aliviar la picazón y las molestias    A bathtub filled with water.  Para ayudar a la picazón:  Coloque paños fríos y húmedos, llamados compresas frías, sobre la erupción cutánea o las ampollas.  [place] un baño frío. Pruebe agregar bicarbonato de sodio o liliane seca en el agua. No se bañe con [place].  Use wendy loción de calamina en la erupción cutánea o las ampollas. Puede conseguir nicole tipo de loción en la alfa.  Cuidado de las ampollas y la erupción cutánea    Mantenga la zechariah de la erupción cutánea cubierta con wendy venda floja.  Use ropa holgada que no roce contra la erupción.  Cuídese la erupción cutánea erick se lo haya indicado el médico. Asegúrese de hacer lo siguiente:  Lávese las avelina con agua y jabón shona al menos 20 segundos antes y después de cambiarse la venda. Si no dispone de agua y jabón, use un desinfectante para avelina.  Mantenga la erupción cutánea y las ampollas limpias lavando la zechariah con jabón suave y agua fría.  Cámbiese la venda.  Verifique la erupción cutánea todos los días para detectar signos de infección. Esté atento a los siguientes signos:  Aumento del enrojecimiento, la hinchazón o el dolor.  Líquido o esteban.  Calor.  Pus o mal olor.  No se rasque el área de la erupción. No se toque las ampollas. Para evitar rascarse:  Tenga las uñas siempre cortas y limpias.  Trate de usar guantes o manoplas mientras duerme.  Instrucciones generales    [place].  Lávese las avelina frecuentemente con agua y jabón shona al menos 20 segundos. Si no dispone de agua y jabón, use un desinfectante para avelina. Lavarse las avelina reduce la probabilidad de contraer wendy infección en la piel.  Salazar infección puede causar varicela en otras personas. Si tiene ampollas que aún no son costras, manténgase alejado de:  Los bebés.  Las embarazadas.  Los niños con eczema.  Personas de edad avanzada que tienen un trasplante de órgano.  Las personas que tienen wendy enfermedad de larga duración, o crónica.  Las personas que no hayan tenido varicela antes.  Las personas que no hayan recibido la vacuna contra la varicela.  ¿Cómo se previene?  Las vacunas son la mejor manera de evitar que contraiga varicela o culebrilla. Hable con salazar médico sobre la aplicación de estas vacunas.    Dónde obtener más información  Centers for Disease Control and Prevention (CDC) (Centros para el Control y la Prevención de Enfermedades): www.cdc.gov  Comuníquese con un médico si:  El dolor no mejora con los medicamentos.  El dolor no mejora después de que se daisha la erupción cutánea.  Tiene signos de infección alrededor de la erupción cutánea.  La erupción cutánea o las ampollas empeoran.  Tiene fiebre o escalofríos.  Solicite ayuda de inmediato si:  La erupción cutánea está en el chelsey o en la nariz.  Tiene dolor en el chelsey o cerca de los ojos.  Pierde la sensación en un lado del chelsey.  Tiene dificultad para rosa maria.  Siente dolor o zumbido en el oído.  Esta información no tiene erick fin reemplazar el consejo del médico. Asegúrese de hacerle al médico cualquier pregunta que tenga.

## 2025-04-28 NOTE — ED PROVIDER NOTE - PATIENT PORTAL LINK FT
You can access the FollowMyHealth Patient Portal offered by [facility] by registering at the following website: [email or web address]. By joining [employer]’s FollowMyHealth portal, you will also be able to view your health information using other applications (apps) compatible with our system.

## 2025-04-28 NOTE — ED PROVIDER NOTE - CARE PLAN
Reason for call:   [x] Refill   [] Prior Auth  [] Other:     Office:   [x] PCP/Provider -   [] Specialty/Provider -     Medication:           Does the patient have enough for 3 days?   [x] Yes   [] No - Send as HP to POD     1 Principal Discharge DX:	Herpes zoster

## 2025-04-28 NOTE — ED PROVIDER NOTE - PHYSICAL EXAMINATION
General: WN/WD NAD  Head: pustular rash involving the V1 dermatome  Eyes: EOM grossly in tact, no subconjunctival hemorrhage,   ENT: moist mucous membranes  Neurology: A&Ox3, nonfocal, REAL x 4  Respiratory: normal respiratory effort  CV: Extremities warm and well perfused  Abdominal: Soft, non-distended  Extremities: No edema  Skin: see head General: WN/WD NAD  Head: pustular rash involving the V1 dermatome  Eyes: EOM grossly in tact, no subconjunctival hemorrhage, no ulcers found on fluorscein stain  ENT: moist mucous membranes  Neurology: A&Ox3, nonfocal, REAL x 4  Respiratory: normal respiratory effort  CV: Extremities warm and well perfused  Abdominal: Soft, non-distended  Extremities: No edema  Skin: see head

## 2025-04-28 NOTE — ED PROVIDER NOTE - ATTENDING CONTRIBUTION TO CARE
Dr. Blunt: I have personally performed a face to face bedside history and physical examination of this patient. I have discussed the history, examination, review of systems, assessment and plan of management with the resident. I have reviewed the electronic medical record and amended it to reflect my history, review of systems, physical exam, assessment and plan.    Dr. Blunt: 80-year-old female, no past medical history, accompanied by adult grandson, interpretation by  Ronald #[id number], presenting with 3 days of atraumatic rash over the left scalp forehead and around the eye, painful, vesicular, no vision changes or tearing.  No rashes anywhere else.  Has not had the shingles vaccine.  No other symptoms.    Gen: No acute distress  HEENT: Mucous membranes moist, pink conjunctivae, EOMI, see skin exam below, using Wood's lamp, no dendritic ulcers noted with fluorescin and tetracaine.  CV: RRR, no clubbing/cyanosis/edema  Resp: CTAB  GI: Abdomen soft, NT, ND. Normal BS. No rebound, no guarding  : No CVAT  Neuro: A&O x 3, moving all 4 extremities  MSK: No spine or joint tenderness to palpation  Skin: Vesicular rash noted over the V1 distribution on the left    Patient presenting with shingles of V1, will stain to rule out ocular involvement/dendritic ulcers. No eye involvement noted - will tx with valtrex and prednisone and dc with ophtho f/u